# Patient Record
Sex: FEMALE | Race: WHITE | Employment: FULL TIME | URBAN - METROPOLITAN AREA
[De-identification: names, ages, dates, MRNs, and addresses within clinical notes are randomized per-mention and may not be internally consistent; named-entity substitution may affect disease eponyms.]

---

## 2022-09-01 ENCOUNTER — OFFICE VISIT (OUTPATIENT)
Dept: NEUROLOGY | Facility: CLINIC | Age: 42
End: 2022-09-01
Payer: COMMERCIAL

## 2022-09-01 ENCOUNTER — LAB ENCOUNTER (OUTPATIENT)
Dept: LAB | Facility: HOSPITAL | Age: 42
End: 2022-09-01
Attending: Other
Payer: COMMERCIAL

## 2022-09-01 VITALS
HEIGHT: 72 IN | SYSTOLIC BLOOD PRESSURE: 126 MMHG | DIASTOLIC BLOOD PRESSURE: 74 MMHG | BODY MASS INDEX: 31.83 KG/M2 | WEIGHT: 235 LBS | HEART RATE: 76 BPM

## 2022-09-01 DIAGNOSIS — G95.9 MYELOPATHY (HCC): ICD-10-CM

## 2022-09-01 DIAGNOSIS — G25.69 OTHER TICS OF ORGANIC ORIGIN: Primary | ICD-10-CM

## 2022-09-01 DIAGNOSIS — G25.69 OTHER TICS OF ORGANIC ORIGIN: ICD-10-CM

## 2022-09-01 LAB
CERULOPLASMIN SERPL-MCNC: 26.7 MG/DL (ref 20–60)
ERYTHROCYTE [SEDIMENTATION RATE] IN BLOOD: 17 MM/HR
TSI SER-ACNC: 1.22 MIU/ML (ref 0.36–3.74)
VIT B12 SERPL-MCNC: 786 PG/ML (ref 193–986)

## 2022-09-01 PROCEDURE — 84443 ASSAY THYROID STIM HORMONE: CPT

## 2022-09-01 PROCEDURE — 3074F SYST BP LT 130 MM HG: CPT | Performed by: OTHER

## 2022-09-01 PROCEDURE — 3078F DIAST BP <80 MM HG: CPT | Performed by: OTHER

## 2022-09-01 PROCEDURE — 85652 RBC SED RATE AUTOMATED: CPT

## 2022-09-01 PROCEDURE — 99204 OFFICE O/P NEW MOD 45 MIN: CPT | Performed by: OTHER

## 2022-09-01 PROCEDURE — 82390 ASSAY OF CERULOPLASMIN: CPT

## 2022-09-01 PROCEDURE — 86038 ANTINUCLEAR ANTIBODIES: CPT

## 2022-09-01 PROCEDURE — 82607 VITAMIN B-12: CPT

## 2022-09-01 PROCEDURE — 36415 COLL VENOUS BLD VENIPUNCTURE: CPT

## 2022-09-01 PROCEDURE — 3008F BODY MASS INDEX DOCD: CPT | Performed by: OTHER

## 2022-09-01 RX ORDER — LEVONORGESTREL AND ETHINYL ESTRADIOL 0.1-0.02MG
1 KIT ORAL DAILY
COMMUNITY

## 2022-09-06 LAB — NUCLEAR IGG TITR SER IF: POSITIVE {TITER}

## 2022-09-08 ENCOUNTER — TELEPHONE (OUTPATIENT)
Dept: NEUROLOGY | Facility: CLINIC | Age: 42
End: 2022-09-08

## 2022-09-08 LAB — ANA NUCLEOLAR TITR SER IF: 1280 {TITER}

## 2022-09-08 NOTE — TELEPHONE ENCOUNTER
I spoke with the patient and informed her of the results. Patient verbalized understanding. The patient was given the number for Dr. Corey Russell. .Reviewed and electronically signed by:  [address] [place] [address], [facility]

## 2022-09-08 NOTE — TELEPHONE ENCOUNTER
Please call patient tell her HAYDE is elevated. This may be contributing to her symptoms. This needs to be further evaluated by rheumatologist.  Please give her name of a rheumatologist, telephone number to be further evaluated.

## 2022-09-09 ENCOUNTER — TELEPHONE (OUTPATIENT)
Dept: NEUROLOGY | Facility: CLINIC | Age: 42
End: 2022-09-09

## 2022-09-12 ENCOUNTER — PATIENT MESSAGE (OUTPATIENT)
Dept: NEUROLOGY | Facility: CLINIC | Age: 42
End: 2022-09-12

## 2022-09-13 RX ORDER — LORAZEPAM 1 MG/1
TABLET ORAL
Qty: 2 TABLET | Refills: 0 | Status: SHIPPED | OUTPATIENT
Start: 2022-09-13

## 2022-09-27 ENCOUNTER — HOSPITAL ENCOUNTER (OUTPATIENT)
Dept: MRI IMAGING | Facility: HOSPITAL | Age: 42
Discharge: HOME | End: 2022-09-27
Attending: Other

## 2022-09-27 ENCOUNTER — TELEPHONE (OUTPATIENT)
Dept: NEUROLOGY | Facility: CLINIC | Age: 42
End: 2022-09-27

## 2022-09-27 DIAGNOSIS — G95.9 MYELOPATHY (HCC): ICD-10-CM

## 2022-09-27 DIAGNOSIS — G25.69 OTHER TICS OF ORGANIC ORIGIN: ICD-10-CM

## 2022-09-27 PROCEDURE — 72141 MRI NECK SPINE W/O DYE: CPT | Performed by: OTHER

## 2022-09-27 PROCEDURE — 70551 MRI BRAIN STEM W/O DYE: CPT | Performed by: OTHER

## 2022-09-27 NOTE — TELEPHONE ENCOUNTER
Please call patient tell her MRI of the brain is normal.  MRI of the cervical spine revealed slight degenerative changes and a bulging disc. No new recommendations.

## 2022-09-27 NOTE — TELEPHONE ENCOUNTER
Called to notify patient of result as noted by Dr Ronaldo Feldman below. Pt verbalized understanding. Patient would like to know if Dr Ronaldo Feldman thinks a sleep study may be beneficial for her as that is when most of the abnormal movements happen. She was pre-authorized for a sleep study from another MD, but would like Dr Conner Smaller opinion.

## 2022-10-12 ENCOUNTER — LAB ENCOUNTER (OUTPATIENT)
Dept: LAB | Age: 42
End: 2022-10-12
Attending: INTERNAL MEDICINE
Payer: COMMERCIAL

## 2022-10-12 ENCOUNTER — OFFICE VISIT (OUTPATIENT)
Dept: RHEUMATOLOGY | Facility: CLINIC | Age: 42
End: 2022-10-12
Payer: COMMERCIAL

## 2022-10-12 VITALS
DIASTOLIC BLOOD PRESSURE: 85 MMHG | HEART RATE: 64 BPM | BODY MASS INDEX: 31.69 KG/M2 | SYSTOLIC BLOOD PRESSURE: 129 MMHG | HEIGHT: 72 IN | WEIGHT: 234 LBS

## 2022-10-12 DIAGNOSIS — R76.8 POSITIVE ANA (ANTINUCLEAR ANTIBODY): ICD-10-CM

## 2022-10-12 DIAGNOSIS — R76.8 POSITIVE ANA (ANTINUCLEAR ANTIBODY): Primary | ICD-10-CM

## 2022-10-12 DIAGNOSIS — F95.9 SIMPLE TICS: ICD-10-CM

## 2022-10-12 LAB
C3 SERPL-MCNC: 121 MG/DL (ref 90–180)
C4 SERPL-MCNC: 31.2 MG/DL (ref 10–40)

## 2022-10-12 PROCEDURE — 86235 NUCLEAR ANTIGEN ANTIBODY: CPT

## 2022-10-12 PROCEDURE — 3008F BODY MASS INDEX DOCD: CPT | Performed by: INTERNAL MEDICINE

## 2022-10-12 PROCEDURE — 99244 OFF/OP CNSLTJ NEW/EST MOD 40: CPT | Performed by: INTERNAL MEDICINE

## 2022-10-12 PROCEDURE — 3079F DIAST BP 80-89 MM HG: CPT | Performed by: INTERNAL MEDICINE

## 2022-10-12 PROCEDURE — 86160 COMPLEMENT ANTIGEN: CPT

## 2022-10-12 PROCEDURE — 86225 DNA ANTIBODY NATIVE: CPT

## 2022-10-12 PROCEDURE — 3074F SYST BP LT 130 MM HG: CPT | Performed by: INTERNAL MEDICINE

## 2022-10-12 PROCEDURE — 36415 COLL VENOUS BLD VENIPUNCTURE: CPT

## 2022-10-12 NOTE — PROGRESS NOTES
Dear Dr. Sunita Wilder,    I saw your patient Glory Green in consultation this afternoon at your request, regarding positive HAYDE. As you know, she is a 30-year-old woman who for 18 months has been having random tics, mostly at night, but also during the day. Her upper body, jaw, or leg will jerk, which lasts a split second. It can happen 5-10 times during the day while awake. Her  states that it can happen frequently at night, on and off. It is always when she is at rest, not performing an activity. Head MRI was normal on September 27th of 2022. Cervical spine MRI showed mild spondylosis from C5-7, with left disc protrusion at C6-7. She had labs done at [facility] May 9th of 2022. CBC and CMP were normal, except glucose of 118. TSH, B12, ferritin, hemoglobin A1c were normal.  You ordered labs that were done on September 1st of 2022. HAYDE was 1-1280 homogeneous, and 1-80 speckled. B12, sed rate 17, TSH normal.    She first saw a pulmonologist, who referred her to yourself, a movement disorder specialist.    Past medical history:  She has had obesity. She had postpartum depression. She has had iron deficiency from her menstrual periods. She had nose septal surgery in 2013. She is on no regular medications. She had been on birth control pills but stopped 4 years ago. No known drug allergies. Family history:  Her sister may have multiple sclerosis. She does not know of any other possible autoimmune disorders. Social history:  She is  with a 3-year and 32-year-old son. She did not have any miscarriages. She works in . No cigarettes. Social alcohol. No caffeine of significance. She plays tennis and walks without trouble. Review of systems:  She does toss and turn at night and is not refreshed on awakening, but her energy is usually good. Her joints are fine in the morning and she has never had inflammatory arthritis.   No fevers, chills, sweats, anorexia, weight loss, sun sensitive rash, alopecia, internal inflammation, oral or nasal ulcers, lymphadenopathy. No shortness of breath or chest pain. No acid reflux, stomach pain, nausea or vomiting, constipation or diarrhea, blood in her stools. No trouble urinating. No dry eyes or mouth. No Raynaud's. She has had migraine headaches for 25 years which are improving. She can have foot cramps. She has had plantar fasciitis. She has high arches. Physical exam:  Pleasant woman in no acute distress. Blood pressure 129/85, pulse 64, height 6' 2\" (1.88 m), weight 234 lb (106.1 kg). No rash. No alopecia. No conjunctival injection. No nasal oral lesions. No cervical adenopathy. Lungs clear. S1 and S2 regular. Abdomen without hepatosplenomegaly or tenderness. Normal bowel sounds. No lower extremity edema. Neck moves well. Shoulders, elbows, wrists, and hands are unremarkable without inflammatory arthritis. Hips, knees, ankles, and feet are unremarkable. She has high arches. No involuntary movements during my history taking or exam.    Assessment and plan:    1. Tics, night more than day. Her brain MRI is normal.    2.  HAYDE 1-1280 homogeneous and 1-80 speckled. I suspect that this is not clinically significant, with her lack of good signs or symptoms strongly suggesting a lupus-like disease. It would be extremely unusual for lupus to present with tics like this. Her other labs are negative. I ordered specific antibodies and complements. I will call her with her results. I will see her back if necessary. Thank you for inviting me to participate in her care. Sincerely,      Trini Singletary MD   Rheumatology.

## 2022-10-14 LAB
DSDNA IGG SERPL IA-ACNC: <0.6 IU/ML
ENA RNP IGG SER IA-ACNC: 1.4 U/ML
ENA SM IGG SER IA-ACNC: <0.7 U/ML
ENA SS-A IGG SER IA-ACNC: <0.4 U/ML
ENA SS-B IGG SER IA-ACNC: <0.4 U/ML
U1 SNRNP IGG SER IA-ACNC: 0.7 U/ML

## 2022-10-15 LAB — HISTONE AB, IGG: 0.4 UNITS

## 2022-10-17 ENCOUNTER — TELEPHONE (OUTPATIENT)
Dept: RHEUMATOLOGY | Facility: CLINIC | Age: 42
End: 2022-10-17

## 2022-10-17 NOTE — TELEPHONE ENCOUNTER
I spoke to Jacqueline concerning her lab results from October 12th of 2022. Her C3 and C4 complements are normal.  Her specific antibodies are negative to histone, SSA, SSB, Erickson, RNP, and dsDNA. I reassured her that her HAYDE is not clinically significant, and is not related to her tics.

## 2022-10-26 ENCOUNTER — TELEPHONE (OUTPATIENT)
Dept: NEUROLOGY | Facility: CLINIC | Age: 42
End: 2022-10-26

## 2022-10-26 RX ORDER — PIMOZIDE 1 MG/1
1 TABLET ORAL 2 TIMES DAILY
Qty: 60 TABLET | Refills: 3 | Status: SHIPPED | OUTPATIENT
Start: 2022-10-26

## 2022-10-26 NOTE — TELEPHONE ENCOUNTER
Called patient. Advised MD e-scribed new medication as noted below.  Scheduled pt for follow-up appointment on 11/22/22

## 2022-10-26 NOTE — TELEPHONE ENCOUNTER
Call the patient tell him I prescribed medication for his tics. Have her return to the office in 1 month for follow-up.

## 2022-12-07 ENCOUNTER — OFFICE VISIT (OUTPATIENT)
Dept: NEUROLOGY | Facility: CLINIC | Age: 42
End: 2022-12-07
Payer: COMMERCIAL

## 2022-12-07 VITALS
BODY MASS INDEX: 31.83 KG/M2 | HEIGHT: 72 IN | WEIGHT: 235 LBS | DIASTOLIC BLOOD PRESSURE: 84 MMHG | SYSTOLIC BLOOD PRESSURE: 124 MMHG

## 2022-12-07 DIAGNOSIS — G25.69 OTHER TICS OF ORGANIC ORIGIN: Primary | ICD-10-CM

## 2022-12-07 PROCEDURE — 3074F SYST BP LT 130 MM HG: CPT | Performed by: OTHER

## 2022-12-07 PROCEDURE — 99213 OFFICE O/P EST LOW 20 MIN: CPT | Performed by: OTHER

## 2022-12-07 PROCEDURE — 3008F BODY MASS INDEX DOCD: CPT | Performed by: OTHER

## 2022-12-07 PROCEDURE — 3079F DIAST BP 80-89 MM HG: CPT | Performed by: OTHER

## 2022-12-07 RX ORDER — CLONAZEPAM 0.5 MG/1
0.5 TABLET ORAL 2 TIMES DAILY PRN
Qty: 90 TABLET | Refills: 3 | Status: SHIPPED | OUTPATIENT
Start: 2022-12-07

## 2022-12-07 NOTE — PROGRESS NOTES
Ms. Lockwood Perches, although the pimozide controlled the tics, multifocal motor tics, she had side effects and she discontinued the medication. She relates multifocal motor tics predominately bother her in the evening. I discussed other treatment options of clonidine, clonazepam, tetrabenazine. She relates rare migraine headaches, predominately with aura, treated with Advil. These have not changed in frequency severity or duration. She has no other neurological symptoms    Recommend that she find a primary care doctor. We will try her on clonazepam 0.5mg in the evening. Told her to call the office in 2 to 3 weeks.

## 2023-04-17 ENCOUNTER — HOSPITAL ENCOUNTER (OUTPATIENT)
Age: 43
Discharge: HOME | End: 2023-04-17
Payer: COMMERCIAL

## 2023-04-17 VITALS
OXYGEN SATURATION: 99 % | RESPIRATION RATE: 16 BRPM | HEIGHT: 72 IN | HEART RATE: 84 BPM | WEIGHT: 220 LBS | BODY MASS INDEX: 29.8 KG/M2 | SYSTOLIC BLOOD PRESSURE: 134 MMHG | TEMPERATURE: 98 F | DIASTOLIC BLOOD PRESSURE: 99 MMHG

## 2023-04-17 DIAGNOSIS — J02.0 STREPTOCOCCAL SORE THROAT: Primary | ICD-10-CM

## 2023-04-17 LAB — S PYO AG THROAT QL: POSITIVE

## 2023-04-17 PROCEDURE — 87880 STREP A ASSAY W/OPTIC: CPT | Performed by: NURSE PRACTITIONER

## 2023-04-17 PROCEDURE — 99203 OFFICE O/P NEW LOW 30 MIN: CPT | Performed by: NURSE PRACTITIONER

## 2023-04-17 RX ORDER — AMOXICILLIN 500 MG/1
500 CAPSULE ORAL 2 TIMES DAILY
Qty: 20 CAPSULE | Refills: 0 | Status: SHIPPED | OUTPATIENT
Start: 2023-04-17 | End: 2023-04-27

## 2023-04-17 NOTE — ED INITIAL ASSESSMENT (HPI)
Pt c/o \"allergy\" symptoms and sore throat for past 5x days (I.e. swollen lymph nodes and congestion)

## 2023-09-28 ENCOUNTER — OFFICE VISIT (OUTPATIENT)
Dept: INTERNAL MEDICINE CLINIC | Facility: CLINIC | Age: 43
End: 2023-09-28
Payer: COMMERCIAL

## 2023-09-28 ENCOUNTER — LAB ENCOUNTER (OUTPATIENT)
Dept: LAB | Age: 43
End: 2023-09-28
Attending: INTERNAL MEDICINE
Payer: COMMERCIAL

## 2023-09-28 VITALS
RESPIRATION RATE: 16 BRPM | TEMPERATURE: 98 F | HEIGHT: 72 IN | OXYGEN SATURATION: 98 % | BODY MASS INDEX: 27.74 KG/M2 | SYSTOLIC BLOOD PRESSURE: 118 MMHG | WEIGHT: 204.81 LBS | DIASTOLIC BLOOD PRESSURE: 72 MMHG | HEART RATE: 95 BPM

## 2023-09-28 DIAGNOSIS — Z00.00 PHYSICAL EXAM, ANNUAL: Primary | ICD-10-CM

## 2023-09-28 DIAGNOSIS — Z13.29 SCREENING FOR ENDOCRINE, METABOLIC AND IMMUNITY DISORDER: ICD-10-CM

## 2023-09-28 DIAGNOSIS — Z13.0 SCREENING FOR ENDOCRINE, METABOLIC AND IMMUNITY DISORDER: ICD-10-CM

## 2023-09-28 DIAGNOSIS — Z13.228 SCREENING FOR ENDOCRINE, METABOLIC AND IMMUNITY DISORDER: ICD-10-CM

## 2023-09-28 DIAGNOSIS — Z00.00 PHYSICAL EXAM, ANNUAL: ICD-10-CM

## 2023-09-28 LAB
ALBUMIN SERPL-MCNC: 3.7 G/DL (ref 3.4–5)
ALBUMIN/GLOB SERPL: 0.9 {RATIO} (ref 1–2)
ALP LIVER SERPL-CCNC: 53 U/L
ALT SERPL-CCNC: 17 U/L
ANION GAP SERPL CALC-SCNC: 8 MMOL/L (ref 0–18)
AST SERPL-CCNC: 6 U/L (ref 15–37)
BASOPHILS # BLD AUTO: 0.03 X10(3) UL (ref 0–0.2)
BASOPHILS NFR BLD AUTO: 0.7 %
BILIRUB SERPL-MCNC: 0.7 MG/DL (ref 0.1–2)
BUN BLD-MCNC: 9 MG/DL (ref 7–18)
CALCIUM BLD-MCNC: 8.7 MG/DL (ref 8.5–10.1)
CHLORIDE SERPL-SCNC: 107 MMOL/L (ref 98–112)
CHOLEST SERPL-MCNC: 138 MG/DL (ref ?–200)
CO2 SERPL-SCNC: 23 MMOL/L (ref 21–32)
CREAT BLD-MCNC: 0.65 MG/DL
EGFRCR SERPLBLD CKD-EPI 2021: 114 ML/MIN/1.73M2 (ref 60–?)
EOSINOPHIL # BLD AUTO: 0.06 X10(3) UL (ref 0–0.7)
EOSINOPHIL NFR BLD AUTO: 1.4 %
ERYTHROCYTE [DISTWIDTH] IN BLOOD BY AUTOMATED COUNT: 12.8 %
EST. AVERAGE GLUCOSE BLD GHB EST-MCNC: 100 MG/DL (ref 68–126)
FASTING PATIENT LIPID ANSWER: YES
FASTING STATUS PATIENT QL REPORTED: YES
GLOBULIN PLAS-MCNC: 3.9 G/DL (ref 2.8–4.4)
GLUCOSE BLD-MCNC: 90 MG/DL (ref 70–99)
HBA1C MFR BLD: 5.1 % (ref ?–5.7)
HCT VFR BLD AUTO: 39 %
HDLC SERPL-MCNC: 51 MG/DL (ref 40–59)
HGB BLD-MCNC: 12.5 G/DL
IMM GRANULOCYTES # BLD AUTO: 0 X10(3) UL (ref 0–1)
IMM GRANULOCYTES NFR BLD: 0 %
LDLC SERPL CALC-MCNC: 76 MG/DL (ref ?–100)
LYMPHOCYTES # BLD AUTO: 1.8 X10(3) UL (ref 1–4)
LYMPHOCYTES NFR BLD AUTO: 41.9 %
MCH RBC QN AUTO: 28.9 PG (ref 26–34)
MCHC RBC AUTO-ENTMCNC: 32.1 G/DL (ref 31–37)
MCV RBC AUTO: 90.3 FL
MONOCYTES # BLD AUTO: 0.45 X10(3) UL (ref 0.1–1)
MONOCYTES NFR BLD AUTO: 10.5 %
NEUTROPHILS # BLD AUTO: 1.96 X10 (3) UL (ref 1.5–7.7)
NEUTROPHILS # BLD AUTO: 1.96 X10(3) UL (ref 1.5–7.7)
NEUTROPHILS NFR BLD AUTO: 45.5 %
NONHDLC SERPL-MCNC: 87 MG/DL (ref ?–130)
OSMOLALITY SERPL CALC.SUM OF ELEC: 284 MOSM/KG (ref 275–295)
PLATELET # BLD AUTO: 285 10(3)UL (ref 150–450)
POTASSIUM SERPL-SCNC: 3.7 MMOL/L (ref 3.5–5.1)
PROT SERPL-MCNC: 7.6 G/DL (ref 6.4–8.2)
RBC # BLD AUTO: 4.32 X10(6)UL
SODIUM SERPL-SCNC: 138 MMOL/L (ref 136–145)
TRIGL SERPL-MCNC: 52 MG/DL (ref 30–149)
TSI SER-ACNC: 1.68 MIU/ML (ref 0.36–3.74)
VLDLC SERPL CALC-MCNC: 8 MG/DL (ref 0–30)
WBC # BLD AUTO: 4.3 X10(3) UL (ref 4–11)

## 2023-09-28 PROCEDURE — 83036 HEMOGLOBIN GLYCOSYLATED A1C: CPT | Performed by: INTERNAL MEDICINE

## 2023-09-28 PROCEDURE — 80061 LIPID PANEL: CPT | Performed by: INTERNAL MEDICINE

## 2023-09-28 PROCEDURE — 3074F SYST BP LT 130 MM HG: CPT | Performed by: INTERNAL MEDICINE

## 2023-09-28 PROCEDURE — 3008F BODY MASS INDEX DOCD: CPT | Performed by: INTERNAL MEDICINE

## 2023-09-28 PROCEDURE — 80050 GENERAL HEALTH PANEL: CPT | Performed by: INTERNAL MEDICINE

## 2023-09-28 PROCEDURE — 3078F DIAST BP <80 MM HG: CPT | Performed by: INTERNAL MEDICINE

## 2023-09-28 PROCEDURE — 99386 PREV VISIT NEW AGE 40-64: CPT | Performed by: INTERNAL MEDICINE

## 2023-09-28 RX ORDER — SEMAGLUTIDE 2.4 MG/.75ML
2.4 INJECTION, SOLUTION SUBCUTANEOUS
COMMUNITY
Start: 2023-05-01

## 2023-09-28 RX ORDER — TAZAROTENE 1 MG/G
CREAM TOPICAL
COMMUNITY
Start: 2023-04-20

## 2023-09-28 RX ORDER — ZOLPIDEM TARTRATE 10 MG/1
TABLET ORAL NIGHTLY PRN
COMMUNITY
Start: 2023-09-13

## 2023-09-28 RX ORDER — ALPRAZOLAM 0.5 MG/1
TABLET ORAL DAILY PRN
COMMUNITY
Start: 2023-08-09

## 2023-10-13 ENCOUNTER — LAB ENCOUNTER (OUTPATIENT)
Dept: LAB | Age: 43
End: 2023-10-13
Attending: STUDENT IN AN ORGANIZED HEALTH CARE EDUCATION/TRAINING PROGRAM
Payer: COMMERCIAL

## 2023-10-13 ENCOUNTER — OFFICE VISIT (OUTPATIENT)
Facility: CLINIC | Age: 43
End: 2023-10-13
Payer: COMMERCIAL

## 2023-10-13 VITALS
HEIGHT: 72 IN | HEART RATE: 74 BPM | WEIGHT: 203 LBS | SYSTOLIC BLOOD PRESSURE: 111 MMHG | DIASTOLIC BLOOD PRESSURE: 64 MMHG | BODY MASS INDEX: 27.5 KG/M2

## 2023-10-13 DIAGNOSIS — N92.1 MENORRHAGIA WITH IRREGULAR CYCLE: ICD-10-CM

## 2023-10-13 DIAGNOSIS — Z01.419 ENCOUNTER FOR ANNUAL ROUTINE GYNECOLOGICAL EXAMINATION: Primary | ICD-10-CM

## 2023-10-13 DIAGNOSIS — N91.4 SECONDARY OLIGOMENORRHEA: ICD-10-CM

## 2023-10-13 LAB
DHEA-S SERPL-MCNC: 103.4 UG/DL
ESTRADIOL SERPL-MCNC: <11 PG/ML
FSH SERPL-ACNC: 84 MIU/ML
PROLACTIN SERPL-MCNC: 8.9 NG/ML
TSI SER-ACNC: 1.48 MIU/ML (ref 0.36–3.74)

## 2023-10-13 PROCEDURE — 82670 ASSAY OF TOTAL ESTRADIOL: CPT

## 2023-10-13 PROCEDURE — 84143 ASSAY OF 17-HYDROXYPREGNENO: CPT

## 2023-10-13 PROCEDURE — 3074F SYST BP LT 130 MM HG: CPT | Performed by: STUDENT IN AN ORGANIZED HEALTH CARE EDUCATION/TRAINING PROGRAM

## 2023-10-13 PROCEDURE — 82627 DEHYDROEPIANDROSTERONE: CPT

## 2023-10-13 PROCEDURE — 84443 ASSAY THYROID STIM HORMONE: CPT

## 2023-10-13 PROCEDURE — 99386 PREV VISIT NEW AGE 40-64: CPT | Performed by: STUDENT IN AN ORGANIZED HEALTH CARE EDUCATION/TRAINING PROGRAM

## 2023-10-13 PROCEDURE — 83001 ASSAY OF GONADOTROPIN (FSH): CPT

## 2023-10-13 PROCEDURE — 3008F BODY MASS INDEX DOCD: CPT | Performed by: STUDENT IN AN ORGANIZED HEALTH CARE EDUCATION/TRAINING PROGRAM

## 2023-10-13 PROCEDURE — 36415 COLL VENOUS BLD VENIPUNCTURE: CPT

## 2023-10-13 PROCEDURE — 84410 TESTOSTERONE BIOAVAILABLE: CPT

## 2023-10-13 PROCEDURE — 3078F DIAST BP <80 MM HG: CPT | Performed by: STUDENT IN AN ORGANIZED HEALTH CARE EDUCATION/TRAINING PROGRAM

## 2023-10-13 PROCEDURE — 83520 IMMUNOASSAY QUANT NOS NONAB: CPT

## 2023-10-13 PROCEDURE — 84146 ASSAY OF PROLACTIN: CPT

## 2023-10-13 PROCEDURE — 87624 HPV HI-RISK TYP POOLED RSLT: CPT | Performed by: STUDENT IN AN ORGANIZED HEALTH CARE EDUCATION/TRAINING PROGRAM

## 2023-10-13 RX ORDER — TRANEXAMIC ACID 650 MG/1
TABLET ORAL
Qty: 30 TABLET | Refills: 11 | Status: SHIPPED | OUTPATIENT
Start: 2023-10-13

## 2023-10-16 LAB
HPV I/H RISK 1 DNA SPEC QL NAA+PROBE: NEGATIVE
MULLERIAN AMH: <0.015 NG/ML

## 2023-10-17 LAB — 17-OH PROGESTERONE: 24 NG/DL

## 2023-10-18 LAB
.: NORMAL
.: NORMAL

## 2023-10-19 LAB
SEX HORM BIND GLOB: 75.8 NMOL/L
TESTOST % FREE+WEAK BND: 8.6 %
TESTOST FREE+WEAK BND: 2.1 NG/DL
TESTOSTERONE TOT /MS: 24.5 NG/DL

## 2023-10-29 ENCOUNTER — HOSPITAL ENCOUNTER (OUTPATIENT)
Dept: ULTRASOUND IMAGING | Age: 43
Discharge: HOME | End: 2023-10-29
Attending: STUDENT IN AN ORGANIZED HEALTH CARE EDUCATION/TRAINING PROGRAM
Payer: COMMERCIAL

## 2023-10-29 ENCOUNTER — HOSPITAL ENCOUNTER (OUTPATIENT)
Dept: ULTRASOUND IMAGING | Age: 43
End: 2023-10-29
Attending: STUDENT IN AN ORGANIZED HEALTH CARE EDUCATION/TRAINING PROGRAM
Payer: COMMERCIAL

## 2023-10-29 DIAGNOSIS — N91.4 SECONDARY OLIGOMENORRHEA: ICD-10-CM

## 2023-10-29 PROCEDURE — 76830 TRANSVAGINAL US NON-OB: CPT | Performed by: STUDENT IN AN ORGANIZED HEALTH CARE EDUCATION/TRAINING PROGRAM

## 2023-10-29 PROCEDURE — 76856 US EXAM PELVIC COMPLETE: CPT | Performed by: STUDENT IN AN ORGANIZED HEALTH CARE EDUCATION/TRAINING PROGRAM

## 2023-11-03 ENCOUNTER — HOSPITAL ENCOUNTER (OUTPATIENT)
Dept: MAMMOGRAPHY | Age: 43
Discharge: HOME | End: 2023-11-03
Attending: STUDENT IN AN ORGANIZED HEALTH CARE EDUCATION/TRAINING PROGRAM
Payer: COMMERCIAL

## 2023-11-03 DIAGNOSIS — Z01.419 ENCOUNTER FOR ANNUAL ROUTINE GYNECOLOGICAL EXAMINATION: ICD-10-CM

## 2023-11-03 PROCEDURE — 77063 BREAST TOMOSYNTHESIS BI: CPT | Performed by: STUDENT IN AN ORGANIZED HEALTH CARE EDUCATION/TRAINING PROGRAM

## 2023-11-03 PROCEDURE — 77067 SCR MAMMO BI INCL CAD: CPT | Performed by: STUDENT IN AN ORGANIZED HEALTH CARE EDUCATION/TRAINING PROGRAM

## 2024-10-28 NOTE — PROGRESS NOTES
[facility]    CHIEF COMPLAINT:   Chief Complaint   Patient presents with    Routine Physical     Sees gyne. 10/13/23-pap. 11/3/23-mammo         HPI:   Sammi Akhtar is a 43 year old female who presents for a complete physical exam. Symptoms: denies discharge, itching, burning or dysuria.     Pap done by gyne 10/2023 negative with negative hpv. Done by dr. Garrett.    Mammo to be ordered by gyne, due 11/2024.      Tdap up to date.   Get covid booster at her local pharmacy.      Sees neuro for tics. Has jerky movements that affect her whole body. Mostly at night, also during the day. Work up has been negative so far. Just thought to be motor tics.      Also saw rheum for positive HAYDE. Work up was negative. Not clinically significant per rheum note.      She sees derm for skin checks.      Mother passed away last year. Seeing psych. On lexapro. Also on lunesta for sleep. Stable.      Also seeing a weight loss specialist via televisit. On wegovy.      Exercise: walks.     Derm: sees derm yearly.      , has a 4 year old son.   Works in .     Wt Readings from Last 6 Encounters:   10/29/24 198 lb 14.4 oz (90.2 kg)   11/10/23 203 lb 3.2 oz (92.2 kg)   10/13/23 203 lb (92.1 kg)   09/28/23 204 lb 12.8 oz (92.9 kg)   04/17/23 220 lb (99.8 kg)   12/07/22 235 lb (106.6 kg)     Body mass index is 25.54 kg/m².       Current Outpatient Medications   Medication Sig Dispense Refill    escitalopram 5 MG Oral Tab Take 1 tablet (5 mg total) by mouth daily.      LEXAPRO 20 MG Oral Tab       propranolol 10 MG Oral Tab Take 1 tablet (10 mg total) by mouth daily as needed.      WEGOVY 1.7 MG/0.75ML Subcutaneous Solution Auto-injector       NON FORMULARY Nutrafol capsule. Take 4 daily      tranexamic acid 650 MG Oral Tab Take 2 tablets 3 times per day to lighten flow of periods. Use for up to 5 days each month/menstrual cycle. 30 tablet 11    Tazarotene 0.1 % External Cream Apply topically.         Past Medical History:    Anxiety    Depression      Past Surgical History:   Procedure Laterality Date    Other surgical history      rhinoplasty in 2014      Family History   Problem Relation Age of Onset    Hypertension Father     Cancer Father         Thyroid/Melanoma    Diabetes Mother     Other (gallbladder cancer) Mother     Dementia Maternal Grandmother     Other (mesothelioma) Maternal Grandfather         had occupational exposure    Other (lung cancer) Paternal Grandfather         heavy smoker      Social History:   Social History     Socioeconomic History    Marital status:    Occupational History    Occupation:    Tobacco Use    Smoking status: Never    Smokeless tobacco: Never   Vaping Use    Vaping status: Never Used   Substance and Sexual Activity    Alcohol use: Yes     Alcohol/week: 1.0 standard drink of alcohol     Types: 1 Glasses of wine per week     Comment: 1-2 glasses of wine a week    Drug use: Never   Other Topics Concern    Caffeine Concern Yes    Exercise Yes     Comment: walking     Occ: . : yes. Children: yes.   Exercise: walking.  Diet: watches calories closely     REVIEW OF SYSTEMS:   GENERAL: feels well otherwise  SKIN: denies any unusual skin lesions  EYES:denies blurred vision or double vision  HEENT: denies nasal congestion, sinus pain or ST  LUNGS: denies shortness of breath with exertion  CARDIOVASCULAR: denies chest pain on exertion  GI: denies abdominal pain,denies heartburn  : denies dysuria, vaginal discharge or itching  MUSCULOSKELETAL: denies back pain  NEURO: denies headaches  PSYCHE: see hpi  HEMATOLOGIC: denies hx of anemia  ENDOCRINE: denies thyroid history  ALL/ASTHMA: denies hx of allergy or asthma    EXAM:   /70 (BP Location: Right arm, Patient Position: Sitting, Cuff Size: adult)   Pulse 72   Temp 97.8 °F (36.6 °C) (Temporal)   Resp 12   Ht 6' 2\" (1.88 m)   Wt 198 lb 14.4 oz (90.2 kg)   LMP 09/02/2024  (Exact Date)   Breastfeeding No   BMI 25.54 kg/m²   Body mass index is 25.54 kg/m².   GENERAL: well developed, well nourished,in no apparent distress  SKIN: no rashes,no suspicious lesions  HEENT: atraumatic, normocephalic,ears and throat are clear  EYES:PERRLA, conjunctiva are clear  NECK: supple,no adenopathy,no bruits  CHEST: no chest tenderness  LUNGS: clear to auscultation  CARDIO: nl s1 and s2, RRR without murmur  GI: good BS's,no masses, HSM or tenderness  BREAST: Deferred. To be done at gyne.    GENITAL/URINARY:  Deferred. To be done at gyne.    MUSCULOSKELETAL: back is not tender,FROM of the back  EXTREMITIES: no cyanosis, clubbing or edema  NEURO: Oriented times three,cranial nerves are intact,motor and sensory are grossly intact    Labs:   Lab Results   Component Value Date/Time    WBC 4.3 09/28/2023 08:30 AM    HGB 12.5 09/28/2023 08:30 AM    .0 09/28/2023 08:30 AM      Lab Results   Component Value Date/Time    GLU 90 09/28/2023 08:30 AM     09/28/2023 08:30 AM    K 3.7 09/28/2023 08:30 AM     09/28/2023 08:30 AM    CO2 23.0 09/28/2023 08:30 AM    CREATSERUM 0.65 09/28/2023 08:30 AM    CA 8.7 09/28/2023 08:30 AM    ALB 3.7 09/28/2023 08:30 AM    TP 7.6 09/28/2023 08:30 AM    ALKPHO 53 09/28/2023 08:30 AM    AST 6 (L) 09/28/2023 08:30 AM    ALT 17 09/28/2023 08:30 AM    BILT 0.7 09/28/2023 08:30 AM    TSH 1.480 10/13/2023 09:49 AM        Lab Results   Component Value Date/Time    CHOLEST 138 09/28/2023 08:30 AM    HDL 51 09/28/2023 08:30 AM    TRIG 52 09/28/2023 08:30 AM    LDL 76 09/28/2023 08:30 AM    NONHDLC 87 09/28/2023 08:30 AM       Lab Results   Component Value Date/Time    A1C 5.1 09/28/2023 08:30 AM      Vitamin D:    No results found for: \"VITD\"        ASSESSMENT AND PLAN:   Sammi Akhtar is a 43 year old female who presents for a complete physical exam.   1. Physical exam, annual  Do labs.   See gyne as planned. Up to date pap. Get mammo next month.    Immunizations discussed.   Continue regular exercise.   - CBC With Differential With Platelet; Future  - Comp Metabolic Panel; Future  - Lipid Panel; Future  - TSH W Reflex To Free T4; Future        Return in about 1 year (around 10/29/2025) for physical.      Lisette Dominguez MD

## 2024-10-29 ENCOUNTER — OFFICE VISIT (OUTPATIENT)
Dept: INTERNAL MEDICINE CLINIC | Facility: CLINIC | Age: 44
End: 2024-10-29
Payer: COMMERCIAL

## 2024-10-29 VITALS
WEIGHT: 198.88 LBS | RESPIRATION RATE: 12 BRPM | DIASTOLIC BLOOD PRESSURE: 70 MMHG | TEMPERATURE: 98 F | BODY MASS INDEX: 26.94 KG/M2 | HEIGHT: 72 IN | HEART RATE: 72 BPM | SYSTOLIC BLOOD PRESSURE: 100 MMHG

## 2024-10-29 DIAGNOSIS — Z00.00 PHYSICAL EXAM, ANNUAL: Primary | ICD-10-CM

## 2024-10-29 PROCEDURE — 3078F DIAST BP <80 MM HG: CPT | Performed by: INTERNAL MEDICINE

## 2024-10-29 PROCEDURE — 99396 PREV VISIT EST AGE 40-64: CPT | Performed by: INTERNAL MEDICINE

## 2024-10-29 PROCEDURE — 3008F BODY MASS INDEX DOCD: CPT | Performed by: INTERNAL MEDICINE

## 2024-10-29 PROCEDURE — 3074F SYST BP LT 130 MM HG: CPT | Performed by: INTERNAL MEDICINE

## 2024-10-29 RX ORDER — ESCITALOPRAM OXALATE 20 MG
TABLET ORAL
COMMUNITY
Start: 2024-07-08

## 2024-10-29 RX ORDER — SEMAGLUTIDE 1.7 MG/.75ML
INJECTION, SOLUTION SUBCUTANEOUS
COMMUNITY

## 2024-10-29 RX ORDER — PROPRANOLOL HYDROCHLORIDE 10 MG/1
10 TABLET ORAL DAILY PRN
COMMUNITY
Start: 2024-09-24

## 2024-10-29 RX ORDER — ESCITALOPRAM OXALATE 5 MG/1
5 TABLET ORAL DAILY
COMMUNITY
Start: 2024-09-24

## 2024-11-22 ENCOUNTER — LAB ENCOUNTER (OUTPATIENT)
Dept: LAB | Age: 44
End: 2024-11-22
Attending: INTERNAL MEDICINE
Payer: COMMERCIAL

## 2024-11-22 DIAGNOSIS — Z00.00 PHYSICAL EXAM, ANNUAL: ICD-10-CM

## 2024-11-22 LAB
ALBUMIN SERPL-MCNC: 4.5 G/DL (ref 3.2–4.8)
ALBUMIN/GLOB SERPL: 1.8 {RATIO} (ref 1–2)
ALP LIVER SERPL-CCNC: 64 U/L
ALT SERPL-CCNC: 9 U/L
ANION GAP SERPL CALC-SCNC: 4 MMOL/L (ref 0–18)
AST SERPL-CCNC: 16 U/L (ref ?–34)
BASOPHILS # BLD AUTO: 0.06 X10(3) UL (ref 0–0.2)
BASOPHILS NFR BLD AUTO: 1.4 %
BILIRUB SERPL-MCNC: 0.6 MG/DL (ref 0.3–1.2)
BUN BLD-MCNC: 14 MG/DL (ref 9–23)
CALCIUM BLD-MCNC: 9.6 MG/DL (ref 8.7–10.4)
CHLORIDE SERPL-SCNC: 106 MMOL/L (ref 98–112)
CHOLEST SERPL-MCNC: 169 MG/DL (ref ?–200)
CO2 SERPL-SCNC: 29 MMOL/L (ref 21–32)
CREAT BLD-MCNC: 0.73 MG/DL
EGFRCR SERPLBLD CKD-EPI 2021: 106 ML/MIN/1.73M2 (ref 60–?)
EOSINOPHIL # BLD AUTO: 0.1 X10(3) UL (ref 0–0.7)
EOSINOPHIL NFR BLD AUTO: 2.3 %
ERYTHROCYTE [DISTWIDTH] IN BLOOD BY AUTOMATED COUNT: 12.6 %
FASTING PATIENT LIPID ANSWER: YES
FASTING STATUS PATIENT QL REPORTED: YES
GLOBULIN PLAS-MCNC: 2.5 G/DL (ref 2–3.5)
GLUCOSE BLD-MCNC: 92 MG/DL (ref 70–99)
HCT VFR BLD AUTO: 37.8 %
HDLC SERPL-MCNC: 68 MG/DL (ref 40–59)
HGB BLD-MCNC: 12.4 G/DL
IMM GRANULOCYTES # BLD AUTO: 0 X10(3) UL (ref 0–1)
IMM GRANULOCYTES NFR BLD: 0 %
LDLC SERPL CALC-MCNC: 92 MG/DL (ref ?–100)
LYMPHOCYTES # BLD AUTO: 2.33 X10(3) UL (ref 1–4)
LYMPHOCYTES NFR BLD AUTO: 54.4 %
MCH RBC QN AUTO: 29.2 PG (ref 26–34)
MCHC RBC AUTO-ENTMCNC: 32.8 G/DL (ref 31–37)
MCV RBC AUTO: 88.9 FL
MONOCYTES # BLD AUTO: 0.41 X10(3) UL (ref 0.1–1)
MONOCYTES NFR BLD AUTO: 9.6 %
NEUTROPHILS # BLD AUTO: 1.38 X10 (3) UL (ref 1.5–7.7)
NEUTROPHILS # BLD AUTO: 1.38 X10(3) UL (ref 1.5–7.7)
NEUTROPHILS NFR BLD AUTO: 32.3 %
NONHDLC SERPL-MCNC: 101 MG/DL (ref ?–130)
OSMOLALITY SERPL CALC.SUM OF ELEC: 288 MOSM/KG (ref 275–295)
PLATELET # BLD AUTO: 265 10(3)UL (ref 150–450)
POTASSIUM SERPL-SCNC: 4.3 MMOL/L (ref 3.5–5.1)
PROT SERPL-MCNC: 7 G/DL (ref 5.7–8.2)
RBC # BLD AUTO: 4.25 X10(6)UL
SODIUM SERPL-SCNC: 139 MMOL/L (ref 136–145)
TRIGL SERPL-MCNC: 42 MG/DL (ref 30–149)
TSI SER-ACNC: 1.8 UIU/ML (ref 0.55–4.78)
VLDLC SERPL CALC-MCNC: 7 MG/DL (ref 0–30)
WBC # BLD AUTO: 4.3 X10(3) UL (ref 4–11)

## 2024-11-22 PROCEDURE — 84443 ASSAY THYROID STIM HORMONE: CPT

## 2024-11-22 PROCEDURE — 80061 LIPID PANEL: CPT

## 2024-11-22 PROCEDURE — 80053 COMPREHEN METABOLIC PANEL: CPT

## 2024-11-22 PROCEDURE — 85025 COMPLETE CBC W/AUTO DIFF WBC: CPT

## 2025-02-28 ENCOUNTER — OFFICE VISIT (OUTPATIENT)
Facility: CLINIC | Age: 45
End: 2025-02-28
Payer: COMMERCIAL

## 2025-02-28 VITALS
HEART RATE: 107 BPM | DIASTOLIC BLOOD PRESSURE: 72 MMHG | SYSTOLIC BLOOD PRESSURE: 126 MMHG | WEIGHT: 199.63 LBS | BODY MASS INDEX: 27.04 KG/M2 | HEIGHT: 72 IN

## 2025-02-28 DIAGNOSIS — Z12.31 BREAST CANCER SCREENING BY MAMMOGRAM: ICD-10-CM

## 2025-02-28 DIAGNOSIS — N92.1 MENORRHAGIA WITH IRREGULAR CYCLE: ICD-10-CM

## 2025-02-28 DIAGNOSIS — Z01.419 ENCOUNTER FOR ANNUAL ROUTINE GYNECOLOGICAL EXAMINATION: Primary | ICD-10-CM

## 2025-02-28 PROCEDURE — 3078F DIAST BP <80 MM HG: CPT | Performed by: STUDENT IN AN ORGANIZED HEALTH CARE EDUCATION/TRAINING PROGRAM

## 2025-02-28 PROCEDURE — 99396 PREV VISIT EST AGE 40-64: CPT | Performed by: STUDENT IN AN ORGANIZED HEALTH CARE EDUCATION/TRAINING PROGRAM

## 2025-02-28 PROCEDURE — 99459 PELVIC EXAMINATION: CPT | Performed by: STUDENT IN AN ORGANIZED HEALTH CARE EDUCATION/TRAINING PROGRAM

## 2025-02-28 PROCEDURE — 3074F SYST BP LT 130 MM HG: CPT | Performed by: STUDENT IN AN ORGANIZED HEALTH CARE EDUCATION/TRAINING PROGRAM

## 2025-02-28 PROCEDURE — 3008F BODY MASS INDEX DOCD: CPT | Performed by: STUDENT IN AN ORGANIZED HEALTH CARE EDUCATION/TRAINING PROGRAM

## 2025-02-28 RX ORDER — TRANEXAMIC ACID 650 MG/1
TABLET ORAL
Qty: 30 TABLET | Refills: 11 | Status: SHIPPED | OUTPATIENT
Start: 2025-02-28

## 2025-02-28 NOTE — PROGRESS NOTES
[facility]  Obstetrics and Gynecology   History & Physical    Chief complaint:   Chief Complaint   Patient presents with    Wellness Visit     Last pap 10/13/23 neg         HPI: Sammi Akhtar is a 44 year old  with Patient's last menstrual period was 2025 (exact date).      Here for annual GYN exam    Hx HMB for years, since delivery of first child ago, per pt had US done in [facility] and told was normal. Was put on tranexamic acid which helps  Still using this, helping some    Menses still pretty irregular - about q60d or so, sometimes shorter sometimes longer  We did labs 10/2023 which resulted with elevated FSH, AMH was <0.015. US unremarkable  Pt was considering trying to become pregnant, I ordered OBDULIA referral, didn't end up going, decided against fertility treatments, figuring if it happens it happens  She did conceive her first pregnancy spontaneously and at that time her AMH was low as well  No menopause sx    Hx Prior Abnormal Pap: No  Pap Date: 10/13/23  Pap Result Notes: negative      PMHx/Surghx reviewed, below. Of note: motor tics, per pt otherwise uncomplicated  Famhx: no family hx breast/ovarian/uterine/colon cancer. Dad had melanoma and thyroid cancer. Mom had gall bladder cancer    PCP: Lisette Dominguez MD    Review of Systems:  Constitutional:  Denies fatigue, night sweats, hot flashes  Eyes:  denies blurred or double vision  Cardiovascular:  denies chest pain or palpitations  Respiratory:  denies shortness of breath  Gastrointestinal:  denies heartburn, abdominal pain, diarrhea or constipation  Genitourinary:  denies dysuria, incontinence, abnormal vaginal discharge, vaginal itching  Musculoskeletal:  denies back pain.  Skin/Breast:  Denies any breast pain, lumps, or discharge.   Neurological:  denies headaches, extremity weakness or numbness.  Psychiatric: denies depression or anxiety.  Endocrine:   denies excessive thirst or urination.  Heme/Lymph:  denies history of anemia,  easy bruising or bleeding.    OB History:  OB History    Para Term  AB Living   1 1   1   1   SAB IAB Ectopic Multiple Live Births           1      # Outcome Date GA Lbr Tristen/2nd Weight Sex Type Anes PTL Lv   1  19 36w5d  6 lb 2.1 oz (2.78 kg) M    ESTRELLITA      Complications: Pre-eclampsia (HCC)       Meds:  Current Outpatient Medications on File Prior to Visit   Medication Sig Dispense Refill    escitalopram 5 MG Oral Tab Take 1 tablet (5 mg total) by mouth daily.      LEXAPRO 20 MG Oral Tab       WEGOVY 1.7 MG/0.75ML Subcutaneous Solution Auto-injector       Tazarotene 0.1 % External Cream Apply topically.      propranolol 10 MG Oral Tab Take 1 tablet (10 mg total) by mouth daily as needed.      NON FORMULARY Nutrafol capsule. Take 4 daily       No current facility-administered medications on file prior to visit.       All:  Allergies   Allergen Reactions    Ceclor RASH       PMH:  Past Medical History:    Abnormal uterine bleeding    Heavy bleeding, nothing abnormal found in uterine ultrasound in     Amenorrhea    Have had a few stretches since  where my cycle is 50-90 days long    Anxiety    Depression       PSH:  Past Surgical History:   Procedure Laterality Date    Insert intrauterine device      Other surgical history      rhinoplasty in     Remove intrauterine device         Social History:  Social History     Socioeconomic History    Marital status:      Spouse name: Not on file    Number of children: Not on file    Years of education: Not on file    Highest education level: Not on file   Occupational History    Occupation:    Tobacco Use    Smoking status: Never    Smokeless tobacco: Never   Vaping Use    Vaping status: Never Used   Substance and Sexual Activity    Alcohol use: Yes     Alcohol/week: 2.0 standard drinks of alcohol     Types: 2 Glasses of wine per week     Comment: occ    Drug use: Never    Sexual activity: Yes      Partners: Male     Comment: withdrawal   Other Topics Concern     Service Not Asked    Blood Transfusions Not Asked    Caffeine Concern No    Occupational Exposure Not Asked    Hobby Hazards Not Asked    Sleep Concern Not Asked    Stress Concern Yes    Weight Concern No    Special Diet Yes     Comment: Vegetarian for 20+ years    Back Care Not Asked    Exercise Yes    Bike Helmet Not Asked    Seat Belt Yes    Self-Exams Not Asked   Social History Narrative    Not on file     Social Drivers of Health     Food Insecurity: Not on file   Transportation Needs: Not on file   Stress: Not on file   Housing Stability: Not on file        Family History:  Family History   Problem Relation Age of Onset    Hypertension Father     Cancer Father         papillary thyroid cancer    Diabetes Mother         Type 2    Other (gallbladder cancer) Mother     Cancer Mother         gallbladder cancer    Dementia Maternal Grandmother     Other (mesothelioma) Maternal Grandfather         had occupational exposure    Other (lung cancer) Paternal Grandfather         heavy smoker       PHYSICAL EXAM:     Vitals:    02/28/25 1032   BP: 126/72   Pulse: 107   Weight: 199 lb 9.6 oz (90.5 kg)   Height: 74\"       Body mass index is 25.63 kg/m².      Constitutional: well developed, well nourished  Head/Face: normocephalic  Breast: normal without palpable masses, tenderness, asymmetry, nipple discharge, nipple retraction or skin changes  Abdomen:  soft, nontender, nondistended, no masses  Skin/Hair: no unusual rashes or bruises  Extremities: no edema, no cyanosis  Psychiatric:  Oriented to time, place, person and situation. Appropriate mood and affect    Pelvic Exam:  External Genitalia: normal appearance, hair distribution, and no lesions  Urethral Meatus:  normal in size, location, without lesions and prolapse  Bladder:  No fullness, masses or tenderness  Vagina:  Normal appearance without lesions, no abnormal discharge  Cervix:  Normal  without tenderness on motion  Uterus: normal in size, contour, position, mobility, without tenderness  Adnexa: normal without masses or tenderness  Perineum: normal      Assessment & Plan:     Sammi Akhtar is a 44 year old      Diagnoses and all orders for this visit:    Encounter for annual routine gynecological examination    Breast cancer screening by mammogram  -     [facility] 2D+3D SCREENING BILAT (CPT=77067/68919); Future    Menorrhagia with irregular cycle  -     tranexamic acid 650 MG Oral Tab; Take 2 tablets 3 times per day to lighten flow of periods. Use for up to 5 days each month/menstrual cycle.      Normal breast and pelvic exam  Discussed that if tranexamic not helping enough with menses and not trying for pregnancy that we could consider hormonal options like OCP or Mirena. She has thought about it, probably not yet, maybe in another year or so    Healthcare maintenance:  Pap: 10/2023 NILM HPV neg - no hx abnormals  HPV vaccine: received  Contraception: see above  Mammogram, age 42: ordered  Colonoscopy, age 47          Reentta Garrett MD  [facility] - OBGYN

## 2025-03-14 ENCOUNTER — HOSPITAL ENCOUNTER (OUTPATIENT)
Dept: MAMMOGRAPHY | Age: 45
Discharge: HOME | End: 2025-03-14
Attending: STUDENT IN AN ORGANIZED HEALTH CARE EDUCATION/TRAINING PROGRAM
Payer: COMMERCIAL

## 2025-03-14 DIAGNOSIS — Z12.31 BREAST CANCER SCREENING BY MAMMOGRAM: ICD-10-CM

## 2025-03-14 PROCEDURE — 77067 SCR MAMMO BI INCL CAD: CPT | Performed by: STUDENT IN AN ORGANIZED HEALTH CARE EDUCATION/TRAINING PROGRAM

## 2025-03-14 PROCEDURE — 77063 BREAST TOMOSYNTHESIS BI: CPT | Performed by: STUDENT IN AN ORGANIZED HEALTH CARE EDUCATION/TRAINING PROGRAM
